# Patient Record
Sex: MALE | Race: WHITE | NOT HISPANIC OR LATINO | URBAN - METROPOLITAN AREA
[De-identification: names, ages, dates, MRNs, and addresses within clinical notes are randomized per-mention and may not be internally consistent; named-entity substitution may affect disease eponyms.]

---

## 2017-01-06 ENCOUNTER — FOLLOW UP (OUTPATIENT)
Dept: URBAN - METROPOLITAN AREA CLINIC 52 | Facility: CLINIC | Age: 82
End: 2017-01-06

## 2017-01-06 DIAGNOSIS — H35.3122: ICD-10-CM

## 2017-01-06 DIAGNOSIS — H35.3211: ICD-10-CM

## 2017-01-06 PROCEDURE — 67028 INJECTION EYE DRUG: CPT

## 2017-01-06 PROCEDURE — 92226 OPHTHALMOSCOPY (SUB): CPT

## 2017-01-06 PROCEDURE — G8427 DOCREV CUR MEDS BY ELIG CLIN: HCPCS

## 2017-01-06 PROCEDURE — 4177F TALK PT/CRGVR RE AREDS PREV: CPT

## 2017-01-06 PROCEDURE — 92014 COMPRE OPH EXAM EST PT 1/>: CPT | Mod: 25

## 2017-01-06 PROCEDURE — 1036F TOBACCO NON-USER: CPT

## 2017-01-06 PROCEDURE — 92134 CPTRZ OPH DX IMG PST SGM RTA: CPT

## 2017-01-06 PROCEDURE — 2019F DILATED MACUL EXAM DONE: CPT

## 2017-01-06 ASSESSMENT — VISUAL ACUITY
OS_SC: 20/20
OD_PH: 20/100-1
OD_SC: 20/200
OS_SC: 20/25
OD_SC: 20/150-1

## 2017-01-06 ASSESSMENT — TONOMETRY
OD_IOP_MMHG: 12
OS_IOP_MMHG: 13

## 2017-02-13 ENCOUNTER — FOLLOW UP (OUTPATIENT)
Dept: URBAN - METROPOLITAN AREA CLINIC 52 | Facility: CLINIC | Age: 82
End: 2017-02-13

## 2017-02-13 DIAGNOSIS — H35.3211: ICD-10-CM

## 2017-02-13 DIAGNOSIS — H35.3122: ICD-10-CM

## 2017-02-13 PROCEDURE — 67028 INJECTION EYE DRUG: CPT

## 2017-02-13 PROCEDURE — 4177F TALK PT/CRGVR RE AREDS PREV: CPT

## 2017-02-13 PROCEDURE — 1036F TOBACCO NON-USER: CPT

## 2017-02-13 PROCEDURE — 92134 CPTRZ OPH DX IMG PST SGM RTA: CPT

## 2017-02-13 PROCEDURE — 92226 OPHTHALMOSCOPY (SUB): CPT

## 2017-02-13 PROCEDURE — 2019F DILATED MACUL EXAM DONE: CPT

## 2017-02-13 PROCEDURE — G8427 DOCREV CUR MEDS BY ELIG CLIN: HCPCS

## 2017-02-13 PROCEDURE — 92012 INTRM OPH EXAM EST PATIENT: CPT | Mod: 25

## 2017-02-13 ASSESSMENT — VISUAL ACUITY
OD_SC: 20/100-2
OS_SC: 20/20
OS_SC: 20/25-1
OD_SC: 20/70-1

## 2017-02-13 ASSESSMENT — TONOMETRY
OD_IOP_MMHG: 12
OS_IOP_MMHG: 12

## 2017-03-31 ENCOUNTER — FOLLOW UP (OUTPATIENT)
Dept: URBAN - METROPOLITAN AREA CLINIC 52 | Facility: CLINIC | Age: 82
End: 2017-03-31

## 2017-03-31 DIAGNOSIS — H35.3122: ICD-10-CM

## 2017-03-31 DIAGNOSIS — H35.3211: ICD-10-CM

## 2017-03-31 PROCEDURE — 67028 INJECTION EYE DRUG: CPT

## 2017-03-31 PROCEDURE — 92014 COMPRE OPH EXAM EST PT 1/>: CPT | Mod: 25

## 2017-03-31 PROCEDURE — G8427 DOCREV CUR MEDS BY ELIG CLIN: HCPCS

## 2017-03-31 PROCEDURE — 2019F DILATED MACUL EXAM DONE: CPT

## 2017-03-31 PROCEDURE — 1036F TOBACCO NON-USER: CPT

## 2017-03-31 PROCEDURE — 4177F TALK PT/CRGVR RE AREDS PREV: CPT

## 2017-03-31 PROCEDURE — 92134 CPTRZ OPH DX IMG PST SGM RTA: CPT

## 2017-03-31 ASSESSMENT — VISUAL ACUITY
OD_SC: 20/100-1
OD_SC: 20/200
OS_SC: 20/20-1
OS_SC: 20/20

## 2017-03-31 ASSESSMENT — TONOMETRY
OD_IOP_MMHG: 13
OS_IOP_MMHG: 13

## 2017-05-05 ENCOUNTER — FOLLOW UP (OUTPATIENT)
Dept: URBAN - METROPOLITAN AREA CLINIC 52 | Facility: CLINIC | Age: 82
End: 2017-05-05

## 2017-05-05 DIAGNOSIS — H35.3211: ICD-10-CM

## 2017-05-05 DIAGNOSIS — H35.3122: ICD-10-CM

## 2017-05-05 PROCEDURE — 92012 INTRM OPH EXAM EST PATIENT: CPT | Mod: 25

## 2017-05-05 PROCEDURE — 67028 INJECTION EYE DRUG: CPT

## 2017-05-05 PROCEDURE — 92134 CPTRZ OPH DX IMG PST SGM RTA: CPT

## 2017-05-05 PROCEDURE — G8427 DOCREV CUR MEDS BY ELIG CLIN: HCPCS

## 2017-05-05 PROCEDURE — 4177F TALK PT/CRGVR RE AREDS PREV: CPT

## 2017-05-05 PROCEDURE — 2019F DILATED MACUL EXAM DONE: CPT

## 2017-05-05 PROCEDURE — 1036F TOBACCO NON-USER: CPT

## 2017-05-05 PROCEDURE — 92226 OPHTHALMOSCOPY (SUB): CPT

## 2017-05-05 ASSESSMENT — VISUAL ACUITY
OS_SC: 20/25
OD_SC: 20/200
OS_SC: 20/25-1
OD_SC: 20/80-2

## 2017-05-05 ASSESSMENT — TONOMETRY
OS_IOP_MMHG: 10
OD_IOP_MMHG: 13

## 2017-06-09 ENCOUNTER — FOLLOW UP (OUTPATIENT)
Dept: URBAN - METROPOLITAN AREA CLINIC 52 | Facility: CLINIC | Age: 82
End: 2017-06-09

## 2017-06-09 DIAGNOSIS — H35.3122: ICD-10-CM

## 2017-06-09 DIAGNOSIS — H35.3211: ICD-10-CM

## 2017-06-09 PROCEDURE — 92226 OPHTHALMOSCOPY (SUB): CPT

## 2017-06-09 PROCEDURE — 4177F TALK PT/CRGVR RE AREDS PREV: CPT

## 2017-06-09 PROCEDURE — G8427 DOCREV CUR MEDS BY ELIG CLIN: HCPCS

## 2017-06-09 PROCEDURE — 1036F TOBACCO NON-USER: CPT

## 2017-06-09 PROCEDURE — 92012 INTRM OPH EXAM EST PATIENT: CPT | Mod: 25

## 2017-06-09 PROCEDURE — 67028 INJECTION EYE DRUG: CPT

## 2017-06-09 PROCEDURE — 92134 CPTRZ OPH DX IMG PST SGM RTA: CPT

## 2017-06-09 PROCEDURE — 2019F DILATED MACUL EXAM DONE: CPT

## 2017-06-09 ASSESSMENT — VISUAL ACUITY
OS_SC: 20/20-1
OD_SC: 20/100
OD_SC: 20/200+2
OD_PH: 20/70-2
OS_SC: 20/20

## 2017-06-09 ASSESSMENT — TONOMETRY
OD_IOP_MMHG: 13
OS_IOP_MMHG: 14

## 2017-07-17 ENCOUNTER — FOLLOW UP (OUTPATIENT)
Dept: URBAN - METROPOLITAN AREA CLINIC 52 | Facility: CLINIC | Age: 82
End: 2017-07-17

## 2017-07-17 DIAGNOSIS — H35.3122: ICD-10-CM

## 2017-07-17 DIAGNOSIS — H35.3211: ICD-10-CM

## 2017-07-17 PROCEDURE — 4177F TALK PT/CRGVR RE AREDS PREV: CPT

## 2017-07-17 PROCEDURE — 67028 INJECTION EYE DRUG: CPT

## 2017-07-17 PROCEDURE — 92012 INTRM OPH EXAM EST PATIENT: CPT | Mod: 25

## 2017-07-17 PROCEDURE — 1036F TOBACCO NON-USER: CPT

## 2017-07-17 PROCEDURE — G8427 DOCREV CUR MEDS BY ELIG CLIN: HCPCS

## 2017-07-17 PROCEDURE — 2019F DILATED MACUL EXAM DONE: CPT

## 2017-07-17 PROCEDURE — 92134 CPTRZ OPH DX IMG PST SGM RTA: CPT

## 2017-07-17 ASSESSMENT — VISUAL ACUITY
OS_SC: 20/25-2
OD_SC: 20/100-1
OS_SC: 20/20
OD_SC: 20/200
OD_PH: 20/70-2

## 2017-07-17 ASSESSMENT — TONOMETRY
OD_IOP_MMHG: 10
OS_IOP_MMHG: 11

## 2017-09-01 ENCOUNTER — FOLLOW UP (OUTPATIENT)
Dept: URBAN - METROPOLITAN AREA CLINIC 52 | Facility: CLINIC | Age: 82
End: 2017-09-01

## 2017-09-01 DIAGNOSIS — H35.3211: ICD-10-CM

## 2017-09-01 DIAGNOSIS — H35.3122: ICD-10-CM

## 2017-09-01 PROCEDURE — 1036F TOBACCO NON-USER: CPT

## 2017-09-01 PROCEDURE — 92134 CPTRZ OPH DX IMG PST SGM RTA: CPT

## 2017-09-01 PROCEDURE — 67028 INJECTION EYE DRUG: CPT

## 2017-09-01 PROCEDURE — 4177F TALK PT/CRGVR RE AREDS PREV: CPT

## 2017-09-01 PROCEDURE — 2019F DILATED MACUL EXAM DONE: CPT

## 2017-09-01 PROCEDURE — 92012 INTRM OPH EXAM EST PATIENT: CPT | Mod: 25

## 2017-09-01 PROCEDURE — G8427 DOCREV CUR MEDS BY ELIG CLIN: HCPCS

## 2017-09-01 ASSESSMENT — VISUAL ACUITY
OS_SC: 20/20
OD_SC: 20/100-2
OS_SC: 20/20
OD_PH: 20/80-2
OD_SC: 20/200

## 2017-09-01 ASSESSMENT — TONOMETRY
OD_IOP_MMHG: 12
OS_IOP_MMHG: 14

## 2017-10-03 ENCOUNTER — FOLLOW UP (OUTPATIENT)
Dept: URBAN - METROPOLITAN AREA CLINIC 52 | Facility: CLINIC | Age: 82
End: 2017-10-03

## 2017-10-03 DIAGNOSIS — H35.3122: ICD-10-CM

## 2017-10-03 DIAGNOSIS — H35.3211: ICD-10-CM

## 2017-10-03 PROCEDURE — 92134 CPTRZ OPH DX IMG PST SGM RTA: CPT

## 2017-10-03 PROCEDURE — 2019F DILATED MACUL EXAM DONE: CPT

## 2017-10-03 PROCEDURE — 92014 COMPRE OPH EXAM EST PT 1/>: CPT | Mod: 25

## 2017-10-03 PROCEDURE — 1036F TOBACCO NON-USER: CPT

## 2017-10-03 PROCEDURE — 67028 INJECTION EYE DRUG: CPT

## 2017-10-03 PROCEDURE — G8427 DOCREV CUR MEDS BY ELIG CLIN: HCPCS

## 2017-10-03 PROCEDURE — 92226 OPHTHALMOSCOPY (SUB): CPT

## 2017-10-03 PROCEDURE — 4177F TALK PT/CRGVR RE AREDS PREV: CPT

## 2017-10-03 ASSESSMENT — VISUAL ACUITY
OD_SC: 20/100+2
OD_SC: 20/200
OS_SC: 20/20
OD_PH: 20/80-2
OS_SC: 20/20-1

## 2017-10-03 ASSESSMENT — TONOMETRY
OD_IOP_MMHG: 10
OS_IOP_MMHG: 10

## 2017-11-10 ENCOUNTER — FOLLOW UP (OUTPATIENT)
Dept: URBAN - METROPOLITAN AREA CLINIC 52 | Facility: CLINIC | Age: 82
End: 2017-11-10

## 2017-11-10 DIAGNOSIS — H35.3211: ICD-10-CM

## 2017-11-10 DIAGNOSIS — H35.3122: ICD-10-CM

## 2017-11-10 PROCEDURE — 92012 INTRM OPH EXAM EST PATIENT: CPT | Mod: 25

## 2017-11-10 PROCEDURE — 2019F DILATED MACUL EXAM DONE: CPT

## 2017-11-10 PROCEDURE — 92226 OPHTHALMOSCOPY (SUB): CPT

## 2017-11-10 PROCEDURE — 67028 INJECTION EYE DRUG: CPT

## 2017-11-10 PROCEDURE — G8427 DOCREV CUR MEDS BY ELIG CLIN: HCPCS

## 2017-11-10 PROCEDURE — 92134 CPTRZ OPH DX IMG PST SGM RTA: CPT

## 2017-11-10 PROCEDURE — 4177F TALK PT/CRGVR RE AREDS PREV: CPT

## 2017-11-10 PROCEDURE — 1036F TOBACCO NON-USER: CPT

## 2017-11-10 ASSESSMENT — VISUAL ACUITY
OS_SC: 20/20
OD_SC: 20/150+2
OD_SC: 20/200
OS_SC: 20/20-1
OD_PH: 20/80-2

## 2017-11-10 ASSESSMENT — TONOMETRY
OS_IOP_MMHG: 12
OD_IOP_MMHG: 13

## 2017-12-22 ENCOUNTER — FOLLOW UP (OUTPATIENT)
Dept: URBAN - METROPOLITAN AREA CLINIC 52 | Facility: CLINIC | Age: 82
End: 2017-12-22

## 2017-12-22 DIAGNOSIS — H43.813: ICD-10-CM

## 2017-12-22 DIAGNOSIS — H35.3211: ICD-10-CM

## 2017-12-22 DIAGNOSIS — H35.3122: ICD-10-CM

## 2017-12-22 DIAGNOSIS — Z96.1: ICD-10-CM

## 2017-12-22 PROCEDURE — G8427 DOCREV CUR MEDS BY ELIG CLIN: HCPCS

## 2017-12-22 PROCEDURE — 67028 INJECTION EYE DRUG: CPT

## 2017-12-22 PROCEDURE — 92134 CPTRZ OPH DX IMG PST SGM RTA: CPT

## 2017-12-22 PROCEDURE — 4177F TALK PT/CRGVR RE AREDS PREV: CPT

## 2017-12-22 PROCEDURE — 2019F DILATED MACUL EXAM DONE: CPT

## 2017-12-22 PROCEDURE — 1036F TOBACCO NON-USER: CPT

## 2017-12-22 PROCEDURE — 92014 COMPRE OPH EXAM EST PT 1/>: CPT | Mod: 25

## 2017-12-22 ASSESSMENT — TONOMETRY
OS_IOP_MMHG: 12
OD_IOP_MMHG: 11

## 2017-12-22 ASSESSMENT — VISUAL ACUITY
OS_SC: 20/20
OS_SC: 20/25+1
OD_PH: 20/70-2
OD_SC: 20/100+2
OD_SC: 20/100-2

## 2018-02-09 ENCOUNTER — FOLLOW UP (OUTPATIENT)
Dept: URBAN - METROPOLITAN AREA CLINIC 52 | Facility: CLINIC | Age: 83
End: 2018-02-09

## 2018-02-09 DIAGNOSIS — H35.3211: ICD-10-CM

## 2018-02-09 DIAGNOSIS — H35.3122: ICD-10-CM

## 2018-02-09 PROCEDURE — 1036F TOBACCO NON-USER: CPT

## 2018-02-09 PROCEDURE — 4177F TALK PT/CRGVR RE AREDS PREV: CPT

## 2018-02-09 PROCEDURE — G8427 DOCREV CUR MEDS BY ELIG CLIN: HCPCS

## 2018-02-09 PROCEDURE — 67028 INJECTION EYE DRUG: CPT

## 2018-02-09 PROCEDURE — 92012 INTRM OPH EXAM EST PATIENT: CPT | Mod: 25

## 2018-02-09 PROCEDURE — 92226 OPHTHALMOSCOPY (SUB): CPT

## 2018-02-09 PROCEDURE — 92134 CPTRZ OPH DX IMG PST SGM RTA: CPT

## 2018-02-09 PROCEDURE — 2019F DILATED MACUL EXAM DONE: CPT

## 2018-02-09 ASSESSMENT — VISUAL ACUITY
OS_SC: 20/30
OD_PH: 20/80-1
OD_SC: 20/150
OS_SC: 20/30
OD_SC: 20/70

## 2018-03-09 ENCOUNTER — FOLLOW UP (OUTPATIENT)
Dept: URBAN - METROPOLITAN AREA CLINIC 52 | Facility: CLINIC | Age: 83
End: 2018-03-09

## 2018-03-09 DIAGNOSIS — H35.3211: ICD-10-CM

## 2018-03-09 DIAGNOSIS — H35.3122: ICD-10-CM

## 2018-03-09 PROCEDURE — G8482 FLU IMMUNIZE ORDER/ADMIN: HCPCS

## 2018-03-09 PROCEDURE — G8427 DOCREV CUR MEDS BY ELIG CLIN: HCPCS

## 2018-03-09 PROCEDURE — 4177F TALK PT/CRGVR RE AREDS PREV: CPT

## 2018-03-09 PROCEDURE — 2019F DILATED MACUL EXAM DONE: CPT

## 2018-03-09 PROCEDURE — 92226 OPHTHALMOSCOPY (SUB): CPT

## 2018-03-09 PROCEDURE — 4040F PNEUMOC VAC/ADMIN/RCVD: CPT

## 2018-03-09 PROCEDURE — 92012 INTRM OPH EXAM EST PATIENT: CPT | Mod: 25

## 2018-03-09 PROCEDURE — 67028 INJECTION EYE DRUG: CPT

## 2018-03-09 PROCEDURE — 1036F TOBACCO NON-USER: CPT

## 2018-03-09 PROCEDURE — 92134 CPTRZ OPH DX IMG PST SGM RTA: CPT

## 2018-03-09 ASSESSMENT — VISUAL ACUITY
OD_PH: 20/80-2
OS_SC: 20/40
OS_PH: 20/30
OS_SC: 20/20
OD_SC: 20/150-2
OD_SC: 20/200

## 2018-03-09 ASSESSMENT — TONOMETRY
OD_IOP_MMHG: 13
OS_IOP_MMHG: 14

## 2018-04-13 ENCOUNTER — FOLLOW UP (OUTPATIENT)
Dept: URBAN - METROPOLITAN AREA CLINIC 52 | Facility: CLINIC | Age: 83
End: 2018-04-13

## 2018-04-13 DIAGNOSIS — H35.3122: ICD-10-CM

## 2018-04-13 DIAGNOSIS — H35.3211: ICD-10-CM

## 2018-04-13 PROCEDURE — 1036F TOBACCO NON-USER: CPT

## 2018-04-13 PROCEDURE — 4040F PNEUMOC VAC/ADMIN/RCVD: CPT

## 2018-04-13 PROCEDURE — 92134 CPTRZ OPH DX IMG PST SGM RTA: CPT

## 2018-04-13 PROCEDURE — 2019F DILATED MACUL EXAM DONE: CPT

## 2018-04-13 PROCEDURE — 67028 INJECTION EYE DRUG: CPT

## 2018-04-13 PROCEDURE — 92226 OPHTHALMOSCOPY (SUB): CPT

## 2018-04-13 PROCEDURE — 92012 INTRM OPH EXAM EST PATIENT: CPT | Mod: 25

## 2018-04-13 PROCEDURE — G8427 DOCREV CUR MEDS BY ELIG CLIN: HCPCS

## 2018-04-13 PROCEDURE — 4177F TALK PT/CRGVR RE AREDS PREV: CPT

## 2018-04-13 PROCEDURE — G8482 FLU IMMUNIZE ORDER/ADMIN: HCPCS

## 2018-04-13 ASSESSMENT — VISUAL ACUITY
OS_SC: 20/25-2
OD_SC: 20/200
OS_SC: 20/20
OD_PH: 20/80-2
OD_SC: 20/150

## 2018-04-13 ASSESSMENT — TONOMETRY
OD_IOP_MMHG: 12
OS_IOP_MMHG: 14

## 2018-05-22 ENCOUNTER — FOLLOW UP (OUTPATIENT)
Dept: URBAN - METROPOLITAN AREA CLINIC 52 | Facility: CLINIC | Age: 83
End: 2018-05-22

## 2018-05-22 DIAGNOSIS — H35.3211: ICD-10-CM

## 2018-05-22 DIAGNOSIS — H35.3122: ICD-10-CM

## 2018-05-22 PROCEDURE — 4177F TALK PT/CRGVR RE AREDS PREV: CPT

## 2018-05-22 PROCEDURE — G8482 FLU IMMUNIZE ORDER/ADMIN: HCPCS

## 2018-05-22 PROCEDURE — 92226 OPHTHALMOSCOPY (SUB): CPT

## 2018-05-22 PROCEDURE — 2019F DILATED MACUL EXAM DONE: CPT

## 2018-05-22 PROCEDURE — 67028 INJECTION EYE DRUG: CPT

## 2018-05-22 PROCEDURE — 4040F PNEUMOC VAC/ADMIN/RCVD: CPT

## 2018-05-22 PROCEDURE — 1036F TOBACCO NON-USER: CPT

## 2018-05-22 PROCEDURE — G8427 DOCREV CUR MEDS BY ELIG CLIN: HCPCS

## 2018-05-22 PROCEDURE — 92012 INTRM OPH EXAM EST PATIENT: CPT | Mod: 25

## 2018-05-22 PROCEDURE — 92134 CPTRZ OPH DX IMG PST SGM RTA: CPT

## 2018-05-22 ASSESSMENT — VISUAL ACUITY
OD_SC: 20/200
OS_SC: 20/25+1
OD_SC: 20/100-1
OS_SC: 20/20

## 2018-05-22 ASSESSMENT — TONOMETRY
OD_IOP_MMHG: 11
OS_IOP_MMHG: 12

## 2018-07-03 ENCOUNTER — FOLLOW UP (OUTPATIENT)
Dept: URBAN - METROPOLITAN AREA CLINIC 52 | Facility: CLINIC | Age: 83
End: 2018-07-03

## 2018-07-03 DIAGNOSIS — H35.3211: ICD-10-CM

## 2018-07-03 DIAGNOSIS — H35.3122: ICD-10-CM

## 2018-07-03 PROCEDURE — 1036F TOBACCO NON-USER: CPT

## 2018-07-03 PROCEDURE — 92134 CPTRZ OPH DX IMG PST SGM RTA: CPT

## 2018-07-03 PROCEDURE — G8427 DOCREV CUR MEDS BY ELIG CLIN: HCPCS

## 2018-07-03 PROCEDURE — 2019F DILATED MACUL EXAM DONE: CPT

## 2018-07-03 PROCEDURE — G8482 FLU IMMUNIZE ORDER/ADMIN: HCPCS

## 2018-07-03 PROCEDURE — 92012 INTRM OPH EXAM EST PATIENT: CPT | Mod: 25

## 2018-07-03 PROCEDURE — 92226 OPHTHALMOSCOPY (SUB): CPT

## 2018-07-03 PROCEDURE — 4177F TALK PT/CRGVR RE AREDS PREV: CPT

## 2018-07-03 PROCEDURE — 4040F PNEUMOC VAC/ADMIN/RCVD: CPT

## 2018-07-03 PROCEDURE — 67028 INJECTION EYE DRUG: CPT

## 2018-07-03 ASSESSMENT — VISUAL ACUITY
OS_SC: 20/20
OS_SC: 20/20-2
OD_SC: 20/100-1
OD_SC: 20/200

## 2018-07-03 ASSESSMENT — TONOMETRY
OD_IOP_MMHG: 09
OS_IOP_MMHG: 09

## 2018-08-14 ENCOUNTER — FOLLOW UP (OUTPATIENT)
Dept: URBAN - METROPOLITAN AREA CLINIC 52 | Facility: CLINIC | Age: 83
End: 2018-08-14

## 2018-08-14 DIAGNOSIS — H43.813: ICD-10-CM

## 2018-08-14 DIAGNOSIS — H35.3122: ICD-10-CM

## 2018-08-14 DIAGNOSIS — H35.3211: ICD-10-CM

## 2018-08-14 PROCEDURE — 4040F PNEUMOC VAC/ADMIN/RCVD: CPT

## 2018-08-14 PROCEDURE — 92226 OPHTHALMOSCOPY (SUB): CPT

## 2018-08-14 PROCEDURE — 92012 INTRM OPH EXAM EST PATIENT: CPT | Mod: 25

## 2018-08-14 PROCEDURE — 92134 CPTRZ OPH DX IMG PST SGM RTA: CPT

## 2018-08-14 PROCEDURE — G8482 FLU IMMUNIZE ORDER/ADMIN: HCPCS

## 2018-08-14 PROCEDURE — 4177F TALK PT/CRGVR RE AREDS PREV: CPT

## 2018-08-14 PROCEDURE — G8427 DOCREV CUR MEDS BY ELIG CLIN: HCPCS

## 2018-08-14 PROCEDURE — 67028 INJECTION EYE DRUG: CPT

## 2018-08-14 PROCEDURE — 2019F DILATED MACUL EXAM DONE: CPT

## 2018-08-14 PROCEDURE — 1036F TOBACCO NON-USER: CPT

## 2018-08-14 ASSESSMENT — VISUAL ACUITY
OS_SC: 20/20
OD_SC: 20/200
OD_SC: 20/100-2
OS_SC: 20/25+2

## 2018-08-14 ASSESSMENT — TONOMETRY
OD_IOP_MMHG: 12
OS_IOP_MMHG: 13

## 2018-09-25 ENCOUNTER — FOLLOW UP (OUTPATIENT)
Dept: URBAN - METROPOLITAN AREA CLINIC 52 | Facility: CLINIC | Age: 83
End: 2018-09-25

## 2018-09-25 DIAGNOSIS — H35.3211: ICD-10-CM

## 2018-09-25 DIAGNOSIS — H43.813: ICD-10-CM

## 2018-09-25 DIAGNOSIS — H35.3122: ICD-10-CM

## 2018-09-25 PROCEDURE — 92012 INTRM OPH EXAM EST PATIENT: CPT | Mod: 25

## 2018-09-25 PROCEDURE — G8427 DOCREV CUR MEDS BY ELIG CLIN: HCPCS

## 2018-09-25 PROCEDURE — G9974 MAC EXAM PERF: HCPCS

## 2018-09-25 PROCEDURE — G8482 FLU IMMUNIZE ORDER/ADMIN: HCPCS

## 2018-09-25 PROCEDURE — 1036F TOBACCO NON-USER: CPT

## 2018-09-25 PROCEDURE — 4177F TALK PT/CRGVR RE AREDS PREV: CPT

## 2018-09-25 PROCEDURE — G9903 PT SCRN TBCO ID AS NON USER: HCPCS

## 2018-09-25 PROCEDURE — 4040F PNEUMOC VAC/ADMIN/RCVD: CPT

## 2018-09-25 PROCEDURE — 92134 CPTRZ OPH DX IMG PST SGM RTA: CPT

## 2018-09-25 PROCEDURE — 67028 INJECTION EYE DRUG: CPT

## 2018-09-25 ASSESSMENT — VISUAL ACUITY
OD_SC: 20/80-2
OD_SC: 20/100
OS_SC: 20/30
OS_SC: 20/25-1

## 2018-09-25 ASSESSMENT — TONOMETRY
OS_IOP_MMHG: 12
OD_IOP_MMHG: 10

## 2018-11-13 ENCOUNTER — FOLLOW UP (OUTPATIENT)
Dept: URBAN - METROPOLITAN AREA CLINIC 52 | Facility: CLINIC | Age: 83
End: 2018-11-13

## 2018-11-13 DIAGNOSIS — H35.3211: ICD-10-CM

## 2018-11-13 DIAGNOSIS — H35.3122: ICD-10-CM

## 2018-11-13 DIAGNOSIS — H43.813: ICD-10-CM

## 2018-11-13 PROCEDURE — G8427 DOCREV CUR MEDS BY ELIG CLIN: HCPCS

## 2018-11-13 PROCEDURE — 92134 CPTRZ OPH DX IMG PST SGM RTA: CPT

## 2018-11-13 PROCEDURE — 1036F TOBACCO NON-USER: CPT

## 2018-11-13 PROCEDURE — 4040F PNEUMOC VAC/ADMIN/RCVD: CPT

## 2018-11-13 PROCEDURE — 67028 INJECTION EYE DRUG: CPT

## 2018-11-13 PROCEDURE — G9974 MAC EXAM PERF: HCPCS

## 2018-11-13 PROCEDURE — 4177F TALK PT/CRGVR RE AREDS PREV: CPT

## 2018-11-13 PROCEDURE — G8482 FLU IMMUNIZE ORDER/ADMIN: HCPCS

## 2018-11-13 PROCEDURE — 92226 OPHTHALMOSCOPY (SUB): CPT

## 2018-11-13 PROCEDURE — G9903 PT SCRN TBCO ID AS NON USER: HCPCS

## 2018-11-13 PROCEDURE — 92014 COMPRE OPH EXAM EST PT 1/>: CPT | Mod: 25

## 2018-11-13 ASSESSMENT — VISUAL ACUITY
OS_SC: 20/30
OD_PH: 20/80-2
OS_SC: 20/25
OD_SC: 20/400
OD_SC: 20/100

## 2018-11-13 ASSESSMENT — TONOMETRY
OD_IOP_MMHG: 10
OS_IOP_MMHG: 11

## 2018-12-28 ENCOUNTER — FOLLOW UP (OUTPATIENT)
Dept: URBAN - METROPOLITAN AREA CLINIC 52 | Facility: CLINIC | Age: 83
End: 2018-12-28

## 2018-12-28 DIAGNOSIS — H35.3211: ICD-10-CM

## 2018-12-28 DIAGNOSIS — H43.813: ICD-10-CM

## 2018-12-28 DIAGNOSIS — H35.3122: ICD-10-CM

## 2018-12-28 PROCEDURE — G9974 MAC EXAM PERF: HCPCS

## 2018-12-28 PROCEDURE — 67028 INJECTION EYE DRUG: CPT

## 2018-12-28 PROCEDURE — 92134 CPTRZ OPH DX IMG PST SGM RTA: CPT

## 2018-12-28 PROCEDURE — 92012 INTRM OPH EXAM EST PATIENT: CPT | Mod: 25

## 2018-12-28 PROCEDURE — G9903 PT SCRN TBCO ID AS NON USER: HCPCS

## 2018-12-28 PROCEDURE — G8482 FLU IMMUNIZE ORDER/ADMIN: HCPCS

## 2018-12-28 PROCEDURE — 4177F TALK PT/CRGVR RE AREDS PREV: CPT

## 2018-12-28 PROCEDURE — 1036F TOBACCO NON-USER: CPT

## 2018-12-28 PROCEDURE — 4040F PNEUMOC VAC/ADMIN/RCVD: CPT

## 2018-12-28 ASSESSMENT — TONOMETRY
OS_IOP_MMHG: 12
OD_IOP_MMHG: 12

## 2018-12-28 ASSESSMENT — VISUAL ACUITY
OS_SC: 20/20
OD_SC: 20/150+1
OD_SC: 20/100-1
OS_SC: 20/25+1

## 2019-02-11 ENCOUNTER — FOLLOW UP (OUTPATIENT)
Dept: URBAN - METROPOLITAN AREA CLINIC 52 | Facility: CLINIC | Age: 84
End: 2019-02-11

## 2019-02-11 DIAGNOSIS — H35.3122: ICD-10-CM

## 2019-02-11 DIAGNOSIS — H35.3211: ICD-10-CM

## 2019-02-11 DIAGNOSIS — H43.813: ICD-10-CM

## 2019-02-11 PROCEDURE — 92012 INTRM OPH EXAM EST PATIENT: CPT | Mod: 25

## 2019-02-11 PROCEDURE — 67028 INJECTION EYE DRUG: CPT

## 2019-02-11 PROCEDURE — 92134 CPTRZ OPH DX IMG PST SGM RTA: CPT

## 2019-02-11 ASSESSMENT — TONOMETRY
OD_IOP_MMHG: 11
OS_IOP_MMHG: 14

## 2019-02-11 ASSESSMENT — VISUAL ACUITY
OD_SC: 20/200
OS_SC: 20/20
OD_SC: 20/150-1
OS_SC: 20/30

## 2019-03-28 ENCOUNTER — FOLLOW UP (OUTPATIENT)
Dept: URBAN - METROPOLITAN AREA CLINIC 52 | Facility: CLINIC | Age: 84
End: 2019-03-28

## 2019-03-28 DIAGNOSIS — H43.813: ICD-10-CM

## 2019-03-28 DIAGNOSIS — H35.3122: ICD-10-CM

## 2019-03-28 DIAGNOSIS — H35.3211: ICD-10-CM

## 2019-03-28 PROCEDURE — 67028 INJECTION EYE DRUG: CPT

## 2019-03-28 PROCEDURE — 92134 CPTRZ OPH DX IMG PST SGM RTA: CPT

## 2019-03-28 PROCEDURE — 92012 INTRM OPH EXAM EST PATIENT: CPT | Mod: 25

## 2019-03-28 ASSESSMENT — VISUAL ACUITY
OS_SC: 20/20
OS_SC: 20/30-1
OD_SC: 20/150-1
OD_SC: 20/70-2

## 2019-03-28 ASSESSMENT — TONOMETRY
OS_IOP_MMHG: 11
OD_IOP_MMHG: 10

## 2019-04-25 ENCOUNTER — FOLLOW UP (OUTPATIENT)
Dept: URBAN - METROPOLITAN AREA CLINIC 52 | Facility: CLINIC | Age: 84
End: 2019-04-25

## 2019-04-25 DIAGNOSIS — H35.3122: ICD-10-CM

## 2019-04-25 DIAGNOSIS — H43.813: ICD-10-CM

## 2019-04-25 DIAGNOSIS — H35.3211: ICD-10-CM

## 2019-04-25 PROCEDURE — 67028 INJECTION EYE DRUG: CPT

## 2019-04-25 PROCEDURE — 92012 INTRM OPH EXAM EST PATIENT: CPT | Mod: 25

## 2019-04-25 PROCEDURE — 92226 OPHTHALMOSCOPY (SUB): CPT

## 2019-04-25 PROCEDURE — 92134 CPTRZ OPH DX IMG PST SGM RTA: CPT

## 2019-04-25 ASSESSMENT — VISUAL ACUITY
OD_SC: 20/100+1
OS_SC: 20/20-1

## 2019-04-25 ASSESSMENT — TONOMETRY
OS_IOP_MMHG: 19
OD_IOP_MMHG: 13

## 2019-06-06 ENCOUNTER — FOLLOW UP (OUTPATIENT)
Dept: URBAN - METROPOLITAN AREA CLINIC 52 | Facility: CLINIC | Age: 84
End: 2019-06-06

## 2019-06-06 DIAGNOSIS — H35.3211: ICD-10-CM

## 2019-06-06 DIAGNOSIS — H43.813: ICD-10-CM

## 2019-06-06 DIAGNOSIS — H35.3122: ICD-10-CM

## 2019-06-06 PROCEDURE — 92134 CPTRZ OPH DX IMG PST SGM RTA: CPT

## 2019-06-06 PROCEDURE — 67028 INJECTION EYE DRUG: CPT

## 2019-06-06 PROCEDURE — 92012 INTRM OPH EXAM EST PATIENT: CPT | Mod: 25

## 2019-06-06 ASSESSMENT — VISUAL ACUITY
OS_SC: 20/25
OS_SC: 20/25
OD_SC: 20/200
OD_SC: 20/150

## 2019-06-06 ASSESSMENT — TONOMETRY
OS_IOP_MMHG: 10
OD_IOP_MMHG: 09

## 2019-07-30 ENCOUNTER — FOLLOW UP (OUTPATIENT)
Dept: URBAN - METROPOLITAN AREA CLINIC 52 | Facility: CLINIC | Age: 84
End: 2019-07-30

## 2019-07-30 DIAGNOSIS — H43.813: ICD-10-CM

## 2019-07-30 DIAGNOSIS — H35.3122: ICD-10-CM

## 2019-07-30 DIAGNOSIS — H35.3211: ICD-10-CM

## 2019-07-30 DIAGNOSIS — Z96.1: ICD-10-CM

## 2019-07-30 PROCEDURE — 92012 INTRM OPH EXAM EST PATIENT: CPT | Mod: 25,RT

## 2019-07-30 PROCEDURE — 92134 CPTRZ OPH DX IMG PST SGM RTA: CPT

## 2019-07-30 PROCEDURE — 67028 INJECTION EYE DRUG: CPT

## 2019-07-30 ASSESSMENT — VISUAL ACUITY
OS_SC: 20/30
OS_SC: 20/25
OD_SC: 20/200-1
OD_PH: 20/150-1
OD_SC: 20/200-2

## 2019-07-30 ASSESSMENT — TONOMETRY
OS_IOP_MMHG: 11
OD_IOP_MMHG: 12

## 2019-09-09 ENCOUNTER — FOLLOW UP (OUTPATIENT)
Dept: URBAN - METROPOLITAN AREA CLINIC 52 | Facility: CLINIC | Age: 84
End: 2019-09-09

## 2019-09-09 DIAGNOSIS — H35.3211: ICD-10-CM

## 2019-09-09 DIAGNOSIS — H35.3122: ICD-10-CM

## 2019-09-09 DIAGNOSIS — H43.813: ICD-10-CM

## 2019-09-09 PROCEDURE — 67028 INJECTION EYE DRUG: CPT

## 2019-09-09 PROCEDURE — 92134 CPTRZ OPH DX IMG PST SGM RTA: CPT

## 2019-09-09 PROCEDURE — 92012 INTRM OPH EXAM EST PATIENT: CPT | Mod: 25

## 2019-09-09 ASSESSMENT — TONOMETRY
OS_IOP_MMHG: 10
OD_IOP_MMHG: 10

## 2019-09-09 ASSESSMENT — VISUAL ACUITY
OS_SC: 20/25-1
OS_SC: 20/20-1
OD_SC: 20/150+2
OD_SC: 20/400

## 2019-10-22 ENCOUNTER — FOLLOW UP (OUTPATIENT)
Dept: URBAN - METROPOLITAN AREA CLINIC 52 | Facility: CLINIC | Age: 84
End: 2019-10-22

## 2019-10-22 DIAGNOSIS — H43.813: ICD-10-CM

## 2019-10-22 DIAGNOSIS — H35.3211: ICD-10-CM

## 2019-10-22 DIAGNOSIS — H35.3122: ICD-10-CM

## 2019-10-22 PROCEDURE — 92012 INTRM OPH EXAM EST PATIENT: CPT | Mod: 25

## 2019-10-22 PROCEDURE — 92134 CPTRZ OPH DX IMG PST SGM RTA: CPT

## 2019-10-22 PROCEDURE — 67028 INJECTION EYE DRUG: CPT

## 2019-10-22 ASSESSMENT — TONOMETRY
OS_IOP_MMHG: 12
OD_IOP_MMHG: 11

## 2019-10-22 ASSESSMENT — VISUAL ACUITY
OS_SC: 20/30
OS_SC: 20/25-2
OD_SC: 20/200-1
OD_SC: 20/150+1

## 2019-12-03 ENCOUNTER — FOLLOW UP (OUTPATIENT)
Dept: URBAN - METROPOLITAN AREA CLINIC 52 | Facility: CLINIC | Age: 84
End: 2019-12-03

## 2019-12-03 DIAGNOSIS — H43.813: ICD-10-CM

## 2019-12-03 DIAGNOSIS — H35.3211: ICD-10-CM

## 2019-12-03 DIAGNOSIS — H35.3122: ICD-10-CM

## 2019-12-03 PROCEDURE — 67028 INJECTION EYE DRUG: CPT

## 2019-12-03 PROCEDURE — 92134 CPTRZ OPH DX IMG PST SGM RTA: CPT

## 2019-12-03 PROCEDURE — 92012 INTRM OPH EXAM EST PATIENT: CPT | Mod: 25

## 2019-12-03 ASSESSMENT — VISUAL ACUITY
OD_SC: 20/150+2
OS_SC: 20/25-2
OS_SC: 20/30-2
OD_SC: 20/200

## 2019-12-03 ASSESSMENT — TONOMETRY
OS_IOP_MMHG: 14
OD_IOP_MMHG: 12

## 2020-01-14 ENCOUNTER — FOLLOW UP (OUTPATIENT)
Dept: URBAN - METROPOLITAN AREA CLINIC 52 | Facility: CLINIC | Age: 85
End: 2020-01-14

## 2020-01-14 DIAGNOSIS — H43.813: ICD-10-CM

## 2020-01-14 DIAGNOSIS — H35.3211: ICD-10-CM

## 2020-01-14 DIAGNOSIS — H35.3122: ICD-10-CM

## 2020-01-14 PROCEDURE — 67028 INJECTION EYE DRUG: CPT

## 2020-01-14 PROCEDURE — 92202 OPSCPY EXTND ON/MAC DRAW: CPT

## 2020-01-14 PROCEDURE — 92134 CPTRZ OPH DX IMG PST SGM RTA: CPT

## 2020-01-14 PROCEDURE — 92012 INTRM OPH EXAM EST PATIENT: CPT | Mod: 25

## 2020-01-14 ASSESSMENT — VISUAL ACUITY
OD_SC: 20/100
OS_SC: 20/25

## 2020-01-14 ASSESSMENT — TONOMETRY
OD_IOP_MMHG: 9
OS_IOP_MMHG: 11

## 2020-02-28 ENCOUNTER — FOLLOW UP (OUTPATIENT)
Dept: URBAN - METROPOLITAN AREA CLINIC 52 | Facility: CLINIC | Age: 85
End: 2020-02-28

## 2020-02-28 DIAGNOSIS — H35.3122: ICD-10-CM

## 2020-02-28 DIAGNOSIS — H35.3211: ICD-10-CM

## 2020-02-28 DIAGNOSIS — H43.813: ICD-10-CM

## 2020-02-28 DIAGNOSIS — Z96.1: ICD-10-CM

## 2020-02-28 PROCEDURE — 92014 COMPRE OPH EXAM EST PT 1/>: CPT | Mod: 25

## 2020-02-28 PROCEDURE — 92202 OPSCPY EXTND ON/MAC DRAW: CPT | Mod: 59

## 2020-02-28 PROCEDURE — 92134 CPTRZ OPH DX IMG PST SGM RTA: CPT

## 2020-02-28 PROCEDURE — 67028 INJECTION EYE DRUG: CPT

## 2020-02-28 ASSESSMENT — VISUAL ACUITY
OS_SC: 20/20
OS_SC: 20/25-2
OD_SC: 20/200-2
OD_SC: 20/150+2

## 2020-02-28 ASSESSMENT — TONOMETRY
OS_IOP_MMHG: 11
OD_IOP_MMHG: 10

## 2020-06-09 ENCOUNTER — FOLLOW UP (OUTPATIENT)
Dept: URBAN - METROPOLITAN AREA CLINIC 52 | Facility: CLINIC | Age: 85
End: 2020-06-09

## 2020-06-09 VITALS — HEIGHT: 60 IN

## 2020-06-09 DIAGNOSIS — H43.813: ICD-10-CM

## 2020-06-09 DIAGNOSIS — H35.3211: ICD-10-CM

## 2020-06-09 DIAGNOSIS — Z96.1: ICD-10-CM

## 2020-06-09 DIAGNOSIS — H35.3122: ICD-10-CM

## 2020-06-09 PROCEDURE — 67028 INJECTION EYE DRUG: CPT

## 2020-06-09 PROCEDURE — 92014 COMPRE OPH EXAM EST PT 1/>: CPT | Mod: 25

## 2020-06-09 PROCEDURE — 92134 CPTRZ OPH DX IMG PST SGM RTA: CPT

## 2020-06-09 PROCEDURE — 92202 OPSCPY EXTND ON/MAC DRAW: CPT | Mod: 59

## 2020-06-09 ASSESSMENT — TONOMETRY
OS_IOP_MMHG: 14
OD_IOP_MMHG: 14

## 2020-06-09 ASSESSMENT — VISUAL ACUITY
OD_SC: 20/150-2
OS_SC: 20/30

## 2020-07-16 ENCOUNTER — FOLLOW UP (OUTPATIENT)
Dept: URBAN - METROPOLITAN AREA CLINIC 52 | Facility: CLINIC | Age: 85
End: 2020-07-16

## 2020-07-16 DIAGNOSIS — H35.3122: ICD-10-CM

## 2020-07-16 DIAGNOSIS — H35.3211: ICD-10-CM

## 2020-07-16 DIAGNOSIS — H43.813: ICD-10-CM

## 2020-07-16 DIAGNOSIS — Z96.1: ICD-10-CM

## 2020-07-16 PROCEDURE — 92202 OPSCPY EXTND ON/MAC DRAW: CPT | Mod: 59

## 2020-07-16 PROCEDURE — 92134 CPTRZ OPH DX IMG PST SGM RTA: CPT

## 2020-07-16 PROCEDURE — 67028 INJECTION EYE DRUG: CPT

## 2020-07-16 PROCEDURE — 92014 COMPRE OPH EXAM EST PT 1/>: CPT | Mod: 25

## 2020-07-16 ASSESSMENT — VISUAL ACUITY
OD_SC: 20/200-2
OS_SC: 20/30-1

## 2020-07-16 ASSESSMENT — TONOMETRY
OD_IOP_MMHG: 12
OS_IOP_MMHG: 13

## 2020-08-27 ENCOUNTER — FOLLOW UP (OUTPATIENT)
Dept: URBAN - METROPOLITAN AREA CLINIC 52 | Facility: CLINIC | Age: 85
End: 2020-08-27

## 2020-08-27 VITALS — HEIGHT: 60 IN

## 2020-08-27 DIAGNOSIS — H35.3211: ICD-10-CM

## 2020-08-27 DIAGNOSIS — H35.3122: ICD-10-CM

## 2020-08-27 PROCEDURE — 92134 CPTRZ OPH DX IMG PST SGM RTA: CPT

## 2020-08-27 PROCEDURE — 92014 COMPRE OPH EXAM EST PT 1/>: CPT | Mod: 25

## 2020-08-27 PROCEDURE — PFS EYLEA PFS

## 2020-08-27 PROCEDURE — 67028 INJECTION EYE DRUG: CPT

## 2020-08-27 PROCEDURE — 92202 OPSCPY EXTND ON/MAC DRAW: CPT | Mod: 59

## 2020-08-27 ASSESSMENT — VISUAL ACUITY
OD_SC: 20/200
OS_SC: 20/25-2

## 2020-08-27 ASSESSMENT — TONOMETRY
OD_IOP_MMHG: 10
OS_IOP_MMHG: 11

## 2020-10-08 ENCOUNTER — FOLLOW UP (OUTPATIENT)
Dept: URBAN - METROPOLITAN AREA CLINIC 52 | Facility: CLINIC | Age: 85
End: 2020-10-08

## 2020-10-08 VITALS — HEIGHT: 60 IN

## 2020-10-08 DIAGNOSIS — Z96.1: ICD-10-CM

## 2020-10-08 DIAGNOSIS — H43.813: ICD-10-CM

## 2020-10-08 DIAGNOSIS — H35.3211: ICD-10-CM

## 2020-10-08 DIAGNOSIS — H35.3122: ICD-10-CM

## 2020-10-08 PROCEDURE — 67028 INJECTION EYE DRUG: CPT

## 2020-10-08 PROCEDURE — 92014 COMPRE OPH EXAM EST PT 1/>: CPT | Mod: 25

## 2020-10-08 PROCEDURE — PFS EYLEA PFS

## 2020-10-08 PROCEDURE — 92134 CPTRZ OPH DX IMG PST SGM RTA: CPT

## 2020-10-08 PROCEDURE — 92202 OPSCPY EXTND ON/MAC DRAW: CPT | Mod: 59

## 2020-10-08 ASSESSMENT — VISUAL ACUITY
OD_SC: 20/150-1
OS_SC: 20/25

## 2020-10-08 ASSESSMENT — TONOMETRY
OS_IOP_MMHG: 14
OD_IOP_MMHG: 11

## 2020-11-19 ENCOUNTER — FOLLOW UP (OUTPATIENT)
Dept: URBAN - METROPOLITAN AREA CLINIC 52 | Facility: CLINIC | Age: 85
End: 2020-11-19

## 2020-11-19 VITALS — HEIGHT: 60 IN

## 2020-11-19 DIAGNOSIS — H35.3211: ICD-10-CM

## 2020-11-19 DIAGNOSIS — H35.3122: ICD-10-CM

## 2020-11-19 PROCEDURE — 92202 OPSCPY EXTND ON/MAC DRAW: CPT | Mod: 59

## 2020-11-19 PROCEDURE — 67028 INJECTION EYE DRUG: CPT

## 2020-11-19 PROCEDURE — PFS EYLEA PFS

## 2020-11-19 PROCEDURE — 92014 COMPRE OPH EXAM EST PT 1/>: CPT | Mod: 25

## 2020-11-19 PROCEDURE — 92134 CPTRZ OPH DX IMG PST SGM RTA: CPT

## 2020-11-19 ASSESSMENT — TONOMETRY
OS_IOP_MMHG: 10
OD_IOP_MMHG: 10

## 2020-11-19 ASSESSMENT — VISUAL ACUITY
OD_SC: 20/250-1
OS_SC: 20/25

## 2020-12-22 ENCOUNTER — FOLLOW UP (OUTPATIENT)
Dept: URBAN - METROPOLITAN AREA CLINIC 52 | Facility: CLINIC | Age: 85
End: 2020-12-22

## 2020-12-22 DIAGNOSIS — H35.3122: ICD-10-CM

## 2020-12-22 DIAGNOSIS — H35.3211: ICD-10-CM

## 2020-12-22 PROCEDURE — 92134 CPTRZ OPH DX IMG PST SGM RTA: CPT

## 2020-12-22 PROCEDURE — 67028 INJECTION EYE DRUG: CPT

## 2020-12-22 PROCEDURE — 92014 COMPRE OPH EXAM EST PT 1/>: CPT | Mod: 25

## 2020-12-22 PROCEDURE — PFS EYLEA PFS

## 2020-12-22 ASSESSMENT — TONOMETRY
OS_IOP_MMHG: 13
OD_IOP_MMHG: 13

## 2020-12-22 ASSESSMENT — VISUAL ACUITY
OS_SC: 20/25+1
OD_SC: 20/150-2

## 2021-01-26 ENCOUNTER — FOLLOW UP (OUTPATIENT)
Dept: URBAN - METROPOLITAN AREA CLINIC 52 | Facility: CLINIC | Age: 86
End: 2021-01-26

## 2021-01-26 DIAGNOSIS — H35.3122: ICD-10-CM

## 2021-01-26 DIAGNOSIS — H35.3211: ICD-10-CM

## 2021-01-26 PROCEDURE — PFS EYLEA PFS

## 2021-01-26 PROCEDURE — 67028 INJECTION EYE DRUG: CPT

## 2021-01-26 PROCEDURE — 92014 COMPRE OPH EXAM EST PT 1/>: CPT | Mod: 25

## 2021-01-26 PROCEDURE — 92134 CPTRZ OPH DX IMG PST SGM RTA: CPT

## 2021-01-26 ASSESSMENT — VISUAL ACUITY
OS_SC: 20/25-2
OD_SC: 20/150-2
OD_PH: 20/100-2

## 2021-01-26 ASSESSMENT — TONOMETRY
OS_IOP_MMHG: 14
OD_IOP_MMHG: 14

## 2021-02-10 ENCOUNTER — TELEPHONE (OUTPATIENT)
Dept: SCHEDULING | Facility: CLINIC | Age: 85
End: 2021-02-10

## 2021-02-10 NOTE — TELEPHONE ENCOUNTER
New Patient Appointment Request    Name of caller: Joshua Peraza    Diagnosis: sob    Referred by: Jack Agarwal MD    Previous Cardiologist name and phone number: Jonas Ames MD (last OV 1 week ago) #[phone]    Best contact number: #[phone]    Additional notes: Pt had cath with Dr. Mancia in 04/2010. Pt's PCP is recommending another cath.

## 2021-03-04 ENCOUNTER — FOLLOW UP (OUTPATIENT)
Dept: URBAN - METROPOLITAN AREA CLINIC 52 | Facility: CLINIC | Age: 86
End: 2021-03-04

## 2021-03-04 VITALS — HEIGHT: 60 IN

## 2021-03-04 DIAGNOSIS — H35.3122: ICD-10-CM

## 2021-03-04 DIAGNOSIS — H35.3211: ICD-10-CM

## 2021-03-04 PROCEDURE — 92202 OPSCPY EXTND ON/MAC DRAW: CPT | Mod: 59

## 2021-03-04 PROCEDURE — PFS EYLEA PFS

## 2021-03-04 PROCEDURE — 92134 CPTRZ OPH DX IMG PST SGM RTA: CPT

## 2021-03-04 PROCEDURE — 92014 COMPRE OPH EXAM EST PT 1/>: CPT | Mod: 25

## 2021-03-04 PROCEDURE — 67028 INJECTION EYE DRUG: CPT

## 2021-03-04 ASSESSMENT — TONOMETRY
OD_IOP_MMHG: 14
OS_IOP_MMHG: 14

## 2021-03-04 ASSESSMENT — VISUAL ACUITY
OD_SC: 20/150+2
OS_SC: 20/30

## 2021-04-22 ENCOUNTER — FOLLOW UP (OUTPATIENT)
Dept: URBAN - METROPOLITAN AREA CLINIC 52 | Facility: CLINIC | Age: 86
End: 2021-04-22

## 2021-04-22 VITALS — HEIGHT: 60 IN

## 2021-04-22 DIAGNOSIS — H35.3211: ICD-10-CM

## 2021-04-22 DIAGNOSIS — H35.3122: ICD-10-CM

## 2021-04-22 PROCEDURE — 67028 INJECTION EYE DRUG: CPT

## 2021-04-22 PROCEDURE — 92134 CPTRZ OPH DX IMG PST SGM RTA: CPT

## 2021-04-22 PROCEDURE — PFS EYLEA PFS

## 2021-04-22 PROCEDURE — 92014 COMPRE OPH EXAM EST PT 1/>: CPT | Mod: 25

## 2021-04-22 ASSESSMENT — TONOMETRY
OD_IOP_MMHG: 15
OS_IOP_MMHG: 13

## 2021-04-22 ASSESSMENT — VISUAL ACUITY
OS_SC: 20/30-2
OD_SC: 20/150

## 2021-05-27 ENCOUNTER — FOLLOW UP (OUTPATIENT)
Dept: URBAN - METROPOLITAN AREA CLINIC 52 | Facility: CLINIC | Age: 86
End: 2021-05-27

## 2021-05-27 VITALS — HEIGHT: 60 IN

## 2021-05-27 DIAGNOSIS — H35.3211: ICD-10-CM

## 2021-05-27 DIAGNOSIS — H35.3122: ICD-10-CM

## 2021-05-27 PROCEDURE — 92134 CPTRZ OPH DX IMG PST SGM RTA: CPT | Mod: 25

## 2021-05-27 PROCEDURE — 67028 INJECTION EYE DRUG: CPT

## 2021-05-27 PROCEDURE — 92014 COMPRE OPH EXAM EST PT 1/>: CPT | Mod: 25

## 2021-05-27 PROCEDURE — PFS EYLEA PFS

## 2021-05-27 ASSESSMENT — TONOMETRY
OS_IOP_MMHG: 13
OD_IOP_MMHG: 11

## 2021-05-27 ASSESSMENT — VISUAL ACUITY
OD_SC: 20/150
OS_SC: 20/30-2

## 2021-07-01 ENCOUNTER — FOLLOW UP (OUTPATIENT)
Dept: URBAN - METROPOLITAN AREA CLINIC 52 | Facility: CLINIC | Age: 86
End: 2021-07-01

## 2021-07-01 DIAGNOSIS — H43.813: ICD-10-CM

## 2021-07-01 DIAGNOSIS — H35.3211: ICD-10-CM

## 2021-07-01 DIAGNOSIS — H35.3122: ICD-10-CM

## 2021-07-01 DIAGNOSIS — Z96.1: ICD-10-CM

## 2021-07-01 PROCEDURE — 92134 CPTRZ OPH DX IMG PST SGM RTA: CPT

## 2021-07-01 PROCEDURE — 67028 INJECTION EYE DRUG: CPT

## 2021-07-01 PROCEDURE — 92014 COMPRE OPH EXAM EST PT 1/>: CPT | Mod: 25

## 2021-07-01 PROCEDURE — PFS EYLEA PFS

## 2021-07-01 ASSESSMENT — VISUAL ACUITY
OD_SC: 20/150
OS_SC: 20/30

## 2021-07-01 ASSESSMENT — TONOMETRY
OS_IOP_MMHG: 14
OD_IOP_MMHG: 15

## 2021-08-05 ENCOUNTER — FOLLOW UP (OUTPATIENT)
Dept: URBAN - METROPOLITAN AREA CLINIC 52 | Facility: CLINIC | Age: 86
End: 2021-08-05

## 2021-08-05 DIAGNOSIS — H35.3122: ICD-10-CM

## 2021-08-05 DIAGNOSIS — H35.3211: ICD-10-CM

## 2021-08-05 PROCEDURE — PFS EYLEA PFS

## 2021-08-05 PROCEDURE — 67028 INJECTION EYE DRUG: CPT

## 2021-08-05 PROCEDURE — 92134 CPTRZ OPH DX IMG PST SGM RTA: CPT

## 2021-08-05 PROCEDURE — 92014 COMPRE OPH EXAM EST PT 1/>: CPT | Mod: 25

## 2021-08-05 ASSESSMENT — TONOMETRY
OD_IOP_MMHG: 10
OS_IOP_MMHG: 11

## 2021-08-05 ASSESSMENT — VISUAL ACUITY
OD_SC: 20/150-2
OS_SC: 20/30

## 2021-09-14 ENCOUNTER — FOLLOW UP (OUTPATIENT)
Dept: URBAN - METROPOLITAN AREA CLINIC 52 | Facility: CLINIC | Age: 86
End: 2021-09-14

## 2021-09-14 DIAGNOSIS — H35.3122: ICD-10-CM

## 2021-09-14 DIAGNOSIS — H35.3211: ICD-10-CM

## 2021-09-14 PROCEDURE — 92012 INTRM OPH EXAM EST PATIENT: CPT | Mod: 25

## 2021-09-14 PROCEDURE — 92134 CPTRZ OPH DX IMG PST SGM RTA: CPT

## 2021-09-14 PROCEDURE — PFS EYLEA PFS

## 2021-09-14 PROCEDURE — 67028 INJECTION EYE DRUG: CPT

## 2021-09-14 ASSESSMENT — VISUAL ACUITY
OS_SC: 20/30
OD_SC: 20/150-2

## 2021-09-14 ASSESSMENT — TONOMETRY
OS_IOP_MMHG: 10
OD_IOP_MMHG: 9

## 2021-10-19 ENCOUNTER — FOLLOW UP (OUTPATIENT)
Dept: URBAN - METROPOLITAN AREA CLINIC 52 | Facility: CLINIC | Age: 86
End: 2021-10-19

## 2021-10-19 DIAGNOSIS — H35.3122: ICD-10-CM

## 2021-10-19 DIAGNOSIS — H35.3211: ICD-10-CM

## 2021-10-19 PROCEDURE — 92014 COMPRE OPH EXAM EST PT 1/>: CPT | Mod: 25

## 2021-10-19 PROCEDURE — 92134 CPTRZ OPH DX IMG PST SGM RTA: CPT

## 2021-10-19 PROCEDURE — PFS EYLEA PFS

## 2021-10-19 PROCEDURE — 67028 INJECTION EYE DRUG: CPT

## 2021-10-19 ASSESSMENT — TONOMETRY
OS_IOP_MMHG: 11
OD_IOP_MMHG: 10

## 2021-10-19 ASSESSMENT — VISUAL ACUITY
OS_SC: 20/25-2
OD_SC: 20/150

## 2021-11-23 ENCOUNTER — FOLLOW UP (OUTPATIENT)
Dept: URBAN - METROPOLITAN AREA CLINIC 52 | Facility: CLINIC | Age: 86
End: 2021-11-23

## 2021-11-23 DIAGNOSIS — H35.3122: ICD-10-CM

## 2021-11-23 DIAGNOSIS — H35.3211: ICD-10-CM

## 2021-11-23 PROCEDURE — 92014 COMPRE OPH EXAM EST PT 1/>: CPT | Mod: 25

## 2021-11-23 PROCEDURE — 92134 CPTRZ OPH DX IMG PST SGM RTA: CPT

## 2021-11-23 PROCEDURE — PFS EYLEA PFS

## 2021-11-23 PROCEDURE — 67028 INJECTION EYE DRUG: CPT

## 2021-11-23 ASSESSMENT — VISUAL ACUITY
OD_SC: 20/150-1
OS_SC: 20/25-1

## 2021-11-23 ASSESSMENT — TONOMETRY
OD_IOP_MMHG: 11
OS_IOP_MMHG: 14

## 2021-12-23 ENCOUNTER — FOLLOW UP (OUTPATIENT)
Dept: URBAN - METROPOLITAN AREA CLINIC 52 | Facility: CLINIC | Age: 86
End: 2021-12-23

## 2021-12-23 DIAGNOSIS — H35.3211: ICD-10-CM

## 2021-12-23 DIAGNOSIS — H35.3122: ICD-10-CM

## 2021-12-23 PROCEDURE — 67028 INJECTION EYE DRUG: CPT

## 2021-12-23 PROCEDURE — 92134 CPTRZ OPH DX IMG PST SGM RTA: CPT

## 2021-12-23 PROCEDURE — 92014 COMPRE OPH EXAM EST PT 1/>: CPT | Mod: 25

## 2021-12-23 PROCEDURE — PFS EYLEA PFS

## 2021-12-23 ASSESSMENT — VISUAL ACUITY
OS_SC: 20/25-2
OD_SC: 20/80+1

## 2021-12-23 ASSESSMENT — TONOMETRY
OS_IOP_MMHG: 13
OD_IOP_MMHG: 12

## 2022-02-08 ENCOUNTER — FOLLOW UP (OUTPATIENT)
Dept: URBAN - METROPOLITAN AREA CLINIC 52 | Facility: CLINIC | Age: 87
End: 2022-02-08

## 2022-02-08 DIAGNOSIS — H35.3122: ICD-10-CM

## 2022-02-08 DIAGNOSIS — H35.3211: ICD-10-CM

## 2022-02-08 PROCEDURE — 67028 INJECTION EYE DRUG: CPT

## 2022-02-08 PROCEDURE — 92014 COMPRE OPH EXAM EST PT 1/>: CPT | Mod: 25

## 2022-02-08 PROCEDURE — 92134 CPTRZ OPH DX IMG PST SGM RTA: CPT

## 2022-02-08 PROCEDURE — PFS EYLEA PFS

## 2022-02-08 ASSESSMENT — VISUAL ACUITY
OD_SC: 20/150
OS_SC: 20/30

## 2022-02-08 ASSESSMENT — TONOMETRY
OS_IOP_MMHG: 13
OD_IOP_MMHG: 11

## 2022-03-29 ENCOUNTER — FOLLOW UP (OUTPATIENT)
Dept: URBAN - METROPOLITAN AREA CLINIC 52 | Facility: CLINIC | Age: 87
End: 2022-03-29

## 2022-03-29 DIAGNOSIS — H35.3211: ICD-10-CM

## 2022-03-29 DIAGNOSIS — H35.3122: ICD-10-CM

## 2022-03-29 PROCEDURE — 92014 COMPRE OPH EXAM EST PT 1/>: CPT | Mod: 25

## 2022-03-29 PROCEDURE — 92134 CPTRZ OPH DX IMG PST SGM RTA: CPT

## 2022-03-29 PROCEDURE — 67028 INJECTION EYE DRUG: CPT

## 2022-03-29 PROCEDURE — PFS EYLEA PFS

## 2022-03-29 ASSESSMENT — VISUAL ACUITY
OS_SC: 20/25-1
OD_SC: 20/200+2

## 2022-03-29 ASSESSMENT — TONOMETRY
OD_IOP_MMHG: 10
OS_IOP_MMHG: 11

## 2022-05-17 ENCOUNTER — FOLLOW UP (OUTPATIENT)
Dept: URBAN - METROPOLITAN AREA CLINIC 52 | Facility: CLINIC | Age: 87
End: 2022-05-17

## 2022-05-17 DIAGNOSIS — H35.3122: ICD-10-CM

## 2022-05-17 DIAGNOSIS — H35.3211: ICD-10-CM

## 2022-05-17 PROCEDURE — 92014 COMPRE OPH EXAM EST PT 1/>: CPT | Mod: 25

## 2022-05-17 PROCEDURE — 92134 CPTRZ OPH DX IMG PST SGM RTA: CPT

## 2022-05-17 PROCEDURE — 67028 INJECTION EYE DRUG: CPT

## 2022-05-17 PROCEDURE — PFS EYLEA PFS

## 2022-05-17 ASSESSMENT — VISUAL ACUITY
OS_SC: 20/40
OD_SC: 20/250-2

## 2022-05-17 ASSESSMENT — TONOMETRY
OS_IOP_MMHG: 12
OD_IOP_MMHG: 11

## 2022-07-05 ENCOUNTER — FOLLOW UP (OUTPATIENT)
Dept: URBAN - METROPOLITAN AREA CLINIC 52 | Facility: CLINIC | Age: 87
End: 2022-07-05

## 2022-07-05 DIAGNOSIS — H35.3211: ICD-10-CM

## 2022-07-05 DIAGNOSIS — H35.3122: ICD-10-CM

## 2022-07-05 PROCEDURE — 92134 CPTRZ OPH DX IMG PST SGM RTA: CPT

## 2022-07-05 PROCEDURE — PFS EYLEA PFS

## 2022-07-05 PROCEDURE — 92014 COMPRE OPH EXAM EST PT 1/>: CPT | Mod: 25

## 2022-07-05 PROCEDURE — 67028 INJECTION EYE DRUG: CPT

## 2022-07-05 ASSESSMENT — TONOMETRY
OD_IOP_MMHG: 10
OS_IOP_MMHG: 11

## 2022-07-05 ASSESSMENT — VISUAL ACUITY
OS_SC: 20/30-1
OD_SC: 20/250

## 2022-08-23 ENCOUNTER — FOLLOW UP (OUTPATIENT)
Dept: URBAN - METROPOLITAN AREA CLINIC 52 | Facility: CLINIC | Age: 87
End: 2022-08-23

## 2022-08-23 DIAGNOSIS — H35.3211: ICD-10-CM

## 2022-08-23 DIAGNOSIS — H35.3122: ICD-10-CM

## 2022-08-23 PROCEDURE — 67028 INJECTION EYE DRUG: CPT

## 2022-08-23 PROCEDURE — 92014 COMPRE OPH EXAM EST PT 1/>: CPT | Mod: 25

## 2022-08-23 PROCEDURE — 92134 CPTRZ OPH DX IMG PST SGM RTA: CPT

## 2022-08-23 PROCEDURE — PFS EYLEA PFS

## 2022-08-23 ASSESSMENT — VISUAL ACUITY
OS_SC: 20/30
OD_SC: 20/300

## 2022-08-23 ASSESSMENT — TONOMETRY
OD_IOP_MMHG: 10
OS_IOP_MMHG: 10

## 2022-10-11 ENCOUNTER — FOLLOW UP (OUTPATIENT)
Dept: URBAN - METROPOLITAN AREA CLINIC 52 | Facility: CLINIC | Age: 87
End: 2022-10-11

## 2022-10-11 DIAGNOSIS — H35.3122: ICD-10-CM

## 2022-10-11 DIAGNOSIS — H35.3211: ICD-10-CM

## 2022-10-11 PROCEDURE — PFS EYLEA PFS

## 2022-10-11 PROCEDURE — 92134 CPTRZ OPH DX IMG PST SGM RTA: CPT

## 2022-10-11 PROCEDURE — 92012 INTRM OPH EXAM EST PATIENT: CPT | Mod: 25

## 2022-10-11 PROCEDURE — 67028 INJECTION EYE DRUG: CPT

## 2022-10-11 ASSESSMENT — TONOMETRY
OD_IOP_MMHG: 10
OS_IOP_MMHG: 10

## 2022-10-11 ASSESSMENT — VISUAL ACUITY
OS_SC: 20/25
OD_SC: 20/250

## 2022-11-29 ENCOUNTER — FOLLOW UP (OUTPATIENT)
Dept: URBAN - METROPOLITAN AREA CLINIC 52 | Facility: CLINIC | Age: 87
End: 2022-11-29

## 2022-11-29 DIAGNOSIS — H35.3122: ICD-10-CM

## 2022-11-29 DIAGNOSIS — H35.3211: ICD-10-CM

## 2022-11-29 DIAGNOSIS — H35.3114: ICD-10-CM

## 2022-11-29 PROCEDURE — 67028 INJECTION EYE DRUG: CPT

## 2022-11-29 PROCEDURE — PFS EYLEA PFS

## 2022-11-29 PROCEDURE — 92134 CPTRZ OPH DX IMG PST SGM RTA: CPT

## 2022-11-29 PROCEDURE — 92012 INTRM OPH EXAM EST PATIENT: CPT | Mod: 25

## 2022-11-29 ASSESSMENT — VISUAL ACUITY
OD_CC: 20/150+2
OS_CC: 20/30-1

## 2022-11-29 ASSESSMENT — TONOMETRY
OD_IOP_MMHG: 11
OS_IOP_MMHG: 11

## 2023-01-17 ENCOUNTER — FOLLOW UP (OUTPATIENT)
Dept: URBAN - METROPOLITAN AREA CLINIC 52 | Facility: CLINIC | Age: 88
End: 2023-01-17

## 2023-01-17 DIAGNOSIS — H35.3211: ICD-10-CM

## 2023-01-17 DIAGNOSIS — H35.3122: ICD-10-CM

## 2023-01-17 DIAGNOSIS — H35.3114: ICD-10-CM

## 2023-01-17 PROCEDURE — 92134 CPTRZ OPH DX IMG PST SGM RTA: CPT

## 2023-01-17 PROCEDURE — 92014 COMPRE OPH EXAM EST PT 1/>: CPT | Mod: 25

## 2023-01-17 PROCEDURE — 67028 INJECTION EYE DRUG: CPT

## 2023-01-17 PROCEDURE — PFS EYLEA PFS

## 2023-01-17 ASSESSMENT — VISUAL ACUITY
OS_SC: 20/30
OD_SC: 20/200+2

## 2023-01-17 ASSESSMENT — TONOMETRY
OS_IOP_MMHG: 10
OD_IOP_MMHG: 10

## 2023-03-07 ENCOUNTER — FOLLOW UP (OUTPATIENT)
Dept: URBAN - METROPOLITAN AREA CLINIC 52 | Facility: CLINIC | Age: 88
End: 2023-03-07

## 2023-03-07 DIAGNOSIS — H35.3211: ICD-10-CM

## 2023-03-07 PROCEDURE — 92014 COMPRE OPH EXAM EST PT 1/>: CPT | Mod: 25

## 2023-03-07 PROCEDURE — 67028 INJECTION EYE DRUG: CPT

## 2023-03-07 PROCEDURE — PFS EYLEA PFS

## 2023-03-07 ASSESSMENT — TONOMETRY
OD_IOP_MMHG: 11
OS_IOP_MMHG: 12

## 2023-03-07 ASSESSMENT — VISUAL ACUITY
OD_SC: 20/150-1
OS_SC: 20/25-2

## 2023-04-25 ENCOUNTER — FOLLOW UP (OUTPATIENT)
Dept: URBAN - METROPOLITAN AREA CLINIC 52 | Facility: CLINIC | Age: 88
End: 2023-04-25

## 2023-04-25 DIAGNOSIS — H35.3211: ICD-10-CM

## 2023-04-25 DIAGNOSIS — H35.3114: ICD-10-CM

## 2023-04-25 DIAGNOSIS — H35.3122: ICD-10-CM

## 2023-04-25 PROCEDURE — PFS EYLEA PFS

## 2023-04-25 PROCEDURE — 92014 COMPRE OPH EXAM EST PT 1/>: CPT | Mod: 25

## 2023-04-25 PROCEDURE — 67028 INJECTION EYE DRUG: CPT

## 2023-04-25 PROCEDURE — 92134 CPTRZ OPH DX IMG PST SGM RTA: CPT

## 2023-04-25 ASSESSMENT — VISUAL ACUITY
OD_SC: 20/200
OS_SC: 20/40-1

## 2023-04-25 ASSESSMENT — TONOMETRY
OS_IOP_MMHG: 10
OD_IOP_MMHG: 10

## 2023-06-13 ENCOUNTER — FOLLOW UP (OUTPATIENT)
Dept: URBAN - METROPOLITAN AREA CLINIC 52 | Facility: CLINIC | Age: 88
End: 2023-06-13

## 2023-06-13 DIAGNOSIS — H35.3122: ICD-10-CM

## 2023-06-13 DIAGNOSIS — H35.3211: ICD-10-CM

## 2023-06-13 DIAGNOSIS — H35.3114: ICD-10-CM

## 2023-06-13 PROCEDURE — 92134 CPTRZ OPH DX IMG PST SGM RTA: CPT

## 2023-06-13 PROCEDURE — 67028 INJECTION EYE DRUG: CPT

## 2023-06-13 PROCEDURE — PFS EYLEA PFS

## 2023-06-13 PROCEDURE — 92014 COMPRE OPH EXAM EST PT 1/>: CPT | Mod: 25

## 2023-06-13 ASSESSMENT — VISUAL ACUITY
OD_SC: 20/250
OS_SC: 20/40

## 2023-06-13 ASSESSMENT — TONOMETRY
OD_IOP_MMHG: 10
OS_IOP_MMHG: 10

## 2023-08-03 ENCOUNTER — FOLLOW UP (OUTPATIENT)
Dept: URBAN - METROPOLITAN AREA CLINIC 52 | Facility: CLINIC | Age: 88
End: 2023-08-03

## 2023-08-03 DIAGNOSIS — H35.3211: ICD-10-CM

## 2023-08-03 DIAGNOSIS — H35.3114: ICD-10-CM

## 2023-08-03 DIAGNOSIS — H35.3122: ICD-10-CM

## 2023-08-03 PROCEDURE — 92014 COMPRE OPH EXAM EST PT 1/>: CPT | Mod: 25

## 2023-08-03 PROCEDURE — 92134 CPTRZ OPH DX IMG PST SGM RTA: CPT

## 2023-08-03 PROCEDURE — 67028 INJECTION EYE DRUG: CPT

## 2023-08-03 PROCEDURE — PFS EYLEA PFS: Mod: JZ

## 2023-08-03 ASSESSMENT — VISUAL ACUITY
OD_SC: 20/350-1
OS_SC: 20/30-1

## 2023-08-03 ASSESSMENT — TONOMETRY
OS_IOP_MMHG: 10
OD_IOP_MMHG: 11

## 2023-09-21 ENCOUNTER — FOLLOW UP (OUTPATIENT)
Dept: URBAN - METROPOLITAN AREA CLINIC 52 | Facility: CLINIC | Age: 88
End: 2023-09-21

## 2023-09-21 DIAGNOSIS — H35.3122: ICD-10-CM

## 2023-09-21 DIAGNOSIS — H35.3114: ICD-10-CM

## 2023-09-21 DIAGNOSIS — H35.3211: ICD-10-CM

## 2023-09-21 PROCEDURE — 92134 CPTRZ OPH DX IMG PST SGM RTA: CPT

## 2023-09-21 PROCEDURE — 92202 OPSCPY EXTND ON/MAC DRAW: CPT | Mod: NC

## 2023-09-21 PROCEDURE — 92014 COMPRE OPH EXAM EST PT 1/>: CPT | Mod: 25

## 2023-09-21 PROCEDURE — 67028 INJECTION EYE DRUG: CPT

## 2023-09-21 PROCEDURE — PFS EYLEA PFS: Mod: JZ

## 2023-09-21 ASSESSMENT — VISUAL ACUITY
OD_SC: 4/200
OD_PH: 20/400-1
OS_SC: 20/50-1

## 2023-09-21 ASSESSMENT — TONOMETRY
OS_IOP_MMHG: 11
OD_IOP_MMHG: 8

## 2023-11-14 ENCOUNTER — FOLLOW UP (OUTPATIENT)
Dept: URBAN - METROPOLITAN AREA CLINIC 52 | Facility: CLINIC | Age: 88
End: 2023-11-14

## 2023-11-14 DIAGNOSIS — H35.3211: ICD-10-CM

## 2023-11-14 DIAGNOSIS — H35.3114: ICD-10-CM

## 2023-11-14 DIAGNOSIS — H35.3122: ICD-10-CM

## 2023-11-14 PROCEDURE — 92014 COMPRE OPH EXAM EST PT 1/>: CPT | Mod: 25

## 2023-11-14 PROCEDURE — 92134 CPTRZ OPH DX IMG PST SGM RTA: CPT

## 2023-11-14 PROCEDURE — PFS EYLEA PFS: Mod: JZ

## 2023-11-14 PROCEDURE — 92250 FUNDUS PHOTOGRAPHY W/I&R: CPT

## 2023-11-14 PROCEDURE — 92250 FUNDUS PHOTOGRAPHY W/I&R: CPT | Mod: NC

## 2023-11-14 PROCEDURE — 67028 INJECTION EYE DRUG: CPT

## 2023-11-14 ASSESSMENT — TONOMETRY
OD_IOP_MMHG: 17
OS_IOP_MMHG: 16

## 2023-11-14 ASSESSMENT — VISUAL ACUITY
OS_SC: 20/40-2
OD_SC: 20/200-2

## 2024-01-11 ENCOUNTER — FOLLOW UP (OUTPATIENT)
Dept: URBAN - METROPOLITAN AREA CLINIC 52 | Facility: CLINIC | Age: 89
End: 2024-01-11

## 2024-01-11 DIAGNOSIS — H35.3211: ICD-10-CM

## 2024-01-11 DIAGNOSIS — H35.3114: ICD-10-CM

## 2024-01-11 DIAGNOSIS — H35.3122: ICD-10-CM

## 2024-01-11 PROCEDURE — PFS EYLEA PFS: Mod: JZ

## 2024-01-11 PROCEDURE — 92014 COMPRE OPH EXAM EST PT 1/>: CPT | Mod: 25

## 2024-01-11 PROCEDURE — 92134 CPTRZ OPH DX IMG PST SGM RTA: CPT

## 2024-01-11 PROCEDURE — 67028 INJECTION EYE DRUG: CPT

## 2024-01-11 PROCEDURE — 92202 OPSCPY EXTND ON/MAC DRAW: CPT | Mod: NC

## 2024-01-11 ASSESSMENT — VISUAL ACUITY
OD_SC: 20/100
OS_SC: 20/40-1

## 2024-01-11 ASSESSMENT — TONOMETRY
OS_IOP_MMHG: 11
OD_IOP_MMHG: 12

## 2024-03-12 ENCOUNTER — FOLLOW UP (OUTPATIENT)
Dept: URBAN - METROPOLITAN AREA CLINIC 52 | Facility: CLINIC | Age: 89
End: 2024-03-12

## 2024-03-12 DIAGNOSIS — H35.3211: ICD-10-CM

## 2024-03-12 DIAGNOSIS — H35.3122: ICD-10-CM

## 2024-03-12 DIAGNOSIS — H35.3114: ICD-10-CM

## 2024-03-12 PROCEDURE — 92134 CPTRZ OPH DX IMG PST SGM RTA: CPT

## 2024-03-12 PROCEDURE — 92014 COMPRE OPH EXAM EST PT 1/>: CPT | Mod: 25

## 2024-03-12 PROCEDURE — HD EYLEA HD 8MG: Mod: JZ

## 2024-03-12 PROCEDURE — 67028 INJECTION EYE DRUG: CPT

## 2024-03-12 PROCEDURE — 92202 OPSCPY EXTND ON/MAC DRAW: CPT | Mod: NC

## 2024-03-12 ASSESSMENT — TONOMETRY
OS_IOP_MMHG: 12
OD_IOP_MMHG: 14

## 2024-03-12 ASSESSMENT — VISUAL ACUITY
OD_SC: 20/200-1
OS_SC: 20/40+1

## 2024-05-07 ENCOUNTER — FOLLOW UP (OUTPATIENT)
Dept: URBAN - METROPOLITAN AREA CLINIC 52 | Facility: CLINIC | Age: 89
End: 2024-05-07

## 2024-05-07 DIAGNOSIS — H35.3114: ICD-10-CM

## 2024-05-07 DIAGNOSIS — H35.3211: ICD-10-CM

## 2024-05-07 DIAGNOSIS — H35.3122: ICD-10-CM

## 2024-05-07 PROCEDURE — 92134 CPTRZ OPH DX IMG PST SGM RTA: CPT

## 2024-05-07 PROCEDURE — 67028 INJECTION EYE DRUG: CPT

## 2024-05-07 PROCEDURE — 92202 OPSCPY EXTND ON/MAC DRAW: CPT | Mod: NC

## 2024-05-07 PROCEDURE — 92014 COMPRE OPH EXAM EST PT 1/>: CPT | Mod: 25

## 2024-05-07 ASSESSMENT — VISUAL ACUITY
OS_SC: 20/40
OD_SC: 20/400

## 2024-05-07 ASSESSMENT — TONOMETRY
OS_IOP_MMHG: 9
OD_IOP_MMHG: 13

## 2024-07-02 ENCOUNTER — FOLLOW UP (OUTPATIENT)
Dept: URBAN - METROPOLITAN AREA CLINIC 52 | Facility: CLINIC | Age: 89
End: 2024-07-02

## 2024-07-02 DIAGNOSIS — H35.3122: ICD-10-CM

## 2024-07-02 DIAGNOSIS — H35.3211: ICD-10-CM

## 2024-07-02 DIAGNOSIS — H35.3114: ICD-10-CM

## 2024-07-02 DIAGNOSIS — H43.813: ICD-10-CM

## 2024-07-02 PROCEDURE — 67028 INJECTION EYE DRUG: CPT

## 2024-07-02 PROCEDURE — 92134 CPTRZ OPH DX IMG PST SGM RTA: CPT

## 2024-07-02 PROCEDURE — 92014 COMPRE OPH EXAM EST PT 1/>: CPT | Mod: 25

## 2024-07-02 PROCEDURE — 92202 OPSCPY EXTND ON/MAC DRAW: CPT | Mod: NC

## 2024-07-02 ASSESSMENT — VISUAL ACUITY
OS_SC: 20/40-1
OD_SC: 20/300-1

## 2024-07-02 ASSESSMENT — TONOMETRY
OS_IOP_MMHG: 14
OD_IOP_MMHG: 14

## 2024-08-29 ENCOUNTER — FOLLOW UP (OUTPATIENT)
Dept: URBAN - METROPOLITAN AREA CLINIC 52 | Facility: CLINIC | Age: 89
End: 2024-08-29

## 2024-08-29 DIAGNOSIS — H35.3114: ICD-10-CM

## 2024-08-29 DIAGNOSIS — H35.3122: ICD-10-CM

## 2024-08-29 DIAGNOSIS — H35.3211: ICD-10-CM

## 2024-08-29 PROCEDURE — 92202 OPSCPY EXTND ON/MAC DRAW: CPT | Mod: NC

## 2024-08-29 PROCEDURE — 67028 INJECTION EYE DRUG: CPT

## 2024-08-29 PROCEDURE — 92134 CPTRZ OPH DX IMG PST SGM RTA: CPT

## 2024-08-29 PROCEDURE — 92014 COMPRE OPH EXAM EST PT 1/>: CPT | Mod: 25

## 2024-08-29 ASSESSMENT — VISUAL ACUITY
OD_SC: 20/250-1
OS_SC: 20/40

## 2024-08-29 ASSESSMENT — TONOMETRY
OD_IOP_MMHG: 12
OS_IOP_MMHG: 13

## 2024-11-14 ENCOUNTER — FOLLOW UP (OUTPATIENT)
Dept: URBAN - METROPOLITAN AREA CLINIC 52 | Facility: CLINIC | Age: 89
End: 2024-11-14

## 2024-11-14 DIAGNOSIS — H35.3114: ICD-10-CM

## 2024-11-14 DIAGNOSIS — H35.3211: ICD-10-CM

## 2024-11-14 DIAGNOSIS — H35.3122: ICD-10-CM

## 2024-11-14 PROCEDURE — 92134 CPTRZ OPH DX IMG PST SGM RTA: CPT

## 2024-11-14 PROCEDURE — 92012 INTRM OPH EXAM EST PATIENT: CPT | Mod: 25

## 2024-11-14 PROCEDURE — 67028 INJECTION EYE DRUG: CPT

## 2024-11-14 PROCEDURE — 92202 OPSCPY EXTND ON/MAC DRAW: CPT | Mod: NC

## 2024-11-14 ASSESSMENT — VISUAL ACUITY
OD_PH: 20/150+2
OS_SC: 20/40-1
OD_SC: 20/200-2

## 2024-11-14 ASSESSMENT — TONOMETRY
OS_IOP_MMHG: 15
OD_IOP_MMHG: 11

## 2025-02-10 ENCOUNTER — FOLLOW UP (OUTPATIENT)
Dept: URBAN - METROPOLITAN AREA CLINIC 52 | Facility: CLINIC | Age: OVER 89
End: 2025-02-10

## 2025-02-10 DIAGNOSIS — H35.3211: ICD-10-CM

## 2025-02-10 DIAGNOSIS — H35.3114: ICD-10-CM

## 2025-02-10 DIAGNOSIS — H43.813: ICD-10-CM

## 2025-02-10 DIAGNOSIS — H35.3122: ICD-10-CM

## 2025-02-10 PROCEDURE — 67028 INJECTION EYE DRUG: CPT

## 2025-02-10 PROCEDURE — 92202 OPSCPY EXTND ON/MAC DRAW: CPT | Mod: NC

## 2025-02-10 PROCEDURE — 92014 COMPRE OPH EXAM EST PT 1/>: CPT | Mod: 25

## 2025-02-10 PROCEDURE — 92134 CPTRZ OPH DX IMG PST SGM RTA: CPT

## 2025-02-10 ASSESSMENT — VISUAL ACUITY
OS_SC: 20/50
OD_PH: 20/300
OD_SC: 20/800

## 2025-02-10 ASSESSMENT — TONOMETRY
OS_IOP_MMHG: 12
OD_IOP_MMHG: 10